# Patient Record
Sex: MALE | Race: WHITE | NOT HISPANIC OR LATINO | Employment: OTHER | URBAN - METROPOLITAN AREA
[De-identification: names, ages, dates, MRNs, and addresses within clinical notes are randomized per-mention and may not be internally consistent; named-entity substitution may affect disease eponyms.]

---

## 2017-02-08 NOTE — PATIENT DISCUSSION
Primary Open Angle Glaucoma Counseling: I have discussed no regimen of glaucoma drops at this time with the patient and have stressed the importance of compliance with follow up appts. Patient instructed to return for follow-up as scheduled.

## 2017-08-01 NOTE — PATIENT DISCUSSION
POAG, OU:  INTRAOCULAR PRESSURE IS WITHIN ACCEPTABLE LIMITS. PATIENT INSTRUCTED WILL CONTINUE TO MONITOR IOP WITHOUT DROPS AT THIS TIME. RETURN FOR FOLLOW-UP AS SCHEDULED. Atrial fibrillation with RVR Debility

## 2017-10-18 ENCOUNTER — ESTABLISHED COMPREHENSIVE EXAM (OUTPATIENT)
Dept: URBAN - METROPOLITAN AREA CLINIC 43 | Facility: CLINIC | Age: 72
End: 2017-10-18

## 2017-10-18 DIAGNOSIS — H35.371: ICD-10-CM

## 2017-10-18 DIAGNOSIS — H04.123: ICD-10-CM

## 2017-10-18 DIAGNOSIS — Z96.1: ICD-10-CM

## 2017-10-18 PROCEDURE — G8756 NO BP MEASURE DOC: HCPCS

## 2017-10-18 PROCEDURE — 92015 DETERMINE REFRACTIVE STATE: CPT

## 2017-10-18 PROCEDURE — 92014 COMPRE OPH EXAM EST PT 1/>: CPT

## 2017-10-18 PROCEDURE — 1036F TOBACCO NON-USER: CPT

## 2017-10-18 PROCEDURE — G8427 DOCREV CUR MEDS BY ELIG CLIN: HCPCS

## 2017-10-18 ASSESSMENT — TONOMETRY
OS_IOP_MMHG: 14
OD_IOP_MMHG: 13

## 2017-10-18 ASSESSMENT — VISUAL ACUITY
OD_SC: J6
OD_SC: 20/20
OS_SC: J6
OS_SC: 20/25-2

## 2017-11-21 NOTE — PATIENT DISCUSSION
No recurrence of edema in either eye. Good response to previous focal laser in both eyes. Edema has resolved.

## 2017-11-21 NOTE — PATIENT DISCUSSION
Proliferative Diabetic Retinopathy is present on examination. Mr. Eryn Hoffman retinopathy appears to be stable at this stage.

## 2017-11-21 NOTE — PATIENT DISCUSSION
Mr. Joel Ewing diabetic retinopathy appears to be stable at this stage, following extensive treatment in both eyes.  [facility] understands the need for periodic exams and I have asked him to return in 6 months. He will call to be seen sooner if he has any problems or notices any changes in his vision.

## 2018-05-22 NOTE — PATIENT DISCUSSION
POAG, OU:  INTRAOCULAR PRESSURE IS BORDERLINE. PATIENT INSTRUCTED TO START DROPS LATANOPROST QHS OU VERSUS SLT OU. PATIENT DESIRES TO START LATANOPROST QHS OU. RETURN FOR FOLLOW-UP AS SCHEDULED.

## 2018-07-09 NOTE — PATIENT DISCUSSION
POAG, OU:  INTRAOCULAR PRESSURE IS WITHIN ACCEPTABLE LIMITS. PATIENT INSTRUCTED TO CONTINUE __LATANOPROST QHS OU______ AND RETURN FOR FOLLOW-UP AS SCHEDULED.

## 2018-10-17 ENCOUNTER — ESTABLISHED COMPREHENSIVE EXAM (OUTPATIENT)
Dept: URBAN - METROPOLITAN AREA CLINIC 43 | Facility: CLINIC | Age: 73
End: 2018-10-17

## 2018-10-17 DIAGNOSIS — H04.123: ICD-10-CM

## 2018-10-17 DIAGNOSIS — Z96.1: ICD-10-CM

## 2018-10-17 DIAGNOSIS — H35.371: ICD-10-CM

## 2018-10-17 PROCEDURE — G8756 NO BP MEASURE DOC: HCPCS

## 2018-10-17 PROCEDURE — 92134 CPTRZ OPH DX IMG PST SGM RTA: CPT

## 2018-10-17 PROCEDURE — 92014 COMPRE OPH EXAM EST PT 1/>: CPT

## 2018-10-17 PROCEDURE — G8427 DOCREV CUR MEDS BY ELIG CLIN: HCPCS

## 2018-10-17 PROCEDURE — G9903 PT SCRN TBCO ID AS NON USER: HCPCS

## 2018-10-17 PROCEDURE — 1036F TOBACCO NON-USER: CPT

## 2018-10-17 ASSESSMENT — VISUAL ACUITY
OD_SC: J6
OD_SC: 20/20-1
OS_SC: J4
OS_CC: J1
OD_CC: J1
OS_SC: 20/20-2

## 2018-10-17 ASSESSMENT — TONOMETRY
OD_IOP_MMHG: 14
OS_IOP_MMHG: 13

## 2019-09-17 NOTE — PATIENT DISCUSSION
POAG, OU:  INTRAOCULAR PRESSURE IS WITHIN ACCEPTABLE LIMITS. PATIENT INSTRUCTED TO CONTINUE _LATANOPROST QHS OU______ AND RETURN FOR FOLLOW-UP AS SCHEDULED.

## 2019-10-22 ENCOUNTER — ESTABLISHED COMPREHENSIVE EXAM (OUTPATIENT)
Dept: URBAN - METROPOLITAN AREA CLINIC 43 | Facility: CLINIC | Age: 74
End: 2019-10-22

## 2019-10-22 DIAGNOSIS — Z98.890: ICD-10-CM

## 2019-10-22 DIAGNOSIS — H04.123: ICD-10-CM

## 2019-10-22 DIAGNOSIS — H35.371: ICD-10-CM

## 2019-10-22 DIAGNOSIS — H43.811: ICD-10-CM

## 2019-10-22 PROCEDURE — 92134 CPTRZ OPH DX IMG PST SGM RTA: CPT

## 2019-10-22 PROCEDURE — 92014 COMPRE OPH EXAM EST PT 1/>: CPT

## 2019-10-22 ASSESSMENT — VISUAL ACUITY
OD_SC: 20/25-1
OD_CC: J1
OD_SC: J4
OS_SC: J3
OS_CC: J1
OS_SC: 20/25

## 2019-10-22 ASSESSMENT — TONOMETRY
OS_IOP_MMHG: 14
OD_IOP_MMHG: 14

## 2020-04-29 NOTE — PATIENT DISCUSSION
POAG, OU:  INTRAOCULAR PRESSURE IS WITHIN ACCEPTABLE LIMITS. PATIENT INSTRUCTED TO CONTINUE _LATANOPROST_AND RETURN FOR FOLLOW-UP AS SCHEDULED.

## 2020-09-01 NOTE — PATIENT DISCUSSION
Mr. Martini Sickle retinal condition appears stable since I last saw him. I have asked that he return on an as needed basis.

## 2020-11-06 ENCOUNTER — ESTABLISHED COMPREHENSIVE EXAM (OUTPATIENT)
Dept: URBAN - METROPOLITAN AREA CLINIC 43 | Facility: CLINIC | Age: 75
End: 2020-11-06

## 2020-11-06 DIAGNOSIS — H35.371: ICD-10-CM

## 2020-11-06 DIAGNOSIS — H43.811: ICD-10-CM

## 2020-11-06 DIAGNOSIS — H04.123: ICD-10-CM

## 2020-11-06 PROCEDURE — 92014 COMPRE OPH EXAM EST PT 1/>: CPT

## 2020-11-06 ASSESSMENT — VISUAL ACUITY
OS_SC: 20/25-1
OS_SC: J2
OD_SC: 20/25+2
OD_SC: J3

## 2020-11-06 ASSESSMENT — TONOMETRY
OS_IOP_MMHG: 09
OD_IOP_MMHG: 10

## 2020-12-29 NOTE — PATIENT DISCUSSION
POAG, OU:  INTRAOCULAR PRESSURE IS WITHIN ACCEPTABLE LIMITS. PATIENT INSTRUCTED TO CONTINUE _LATANOPROST____ AND RETURN FOR FOLLOW-UP AS SCHEDULED.

## 2021-08-10 NOTE — PATIENT DISCUSSION
POAG, OU:  INTRAOCULAR PRESSURE IS WITHIN ACCEPTABLE LIMITS. PATIENT INSTRUCTED TO CONTINUE __LATANOPROST_ AND RETURN FOR FOLLOW-UP AS SCHEDULED.

## 2021-11-12 ENCOUNTER — ESTABLISHED COMPREHENSIVE EXAM (OUTPATIENT)
Dept: URBAN - METROPOLITAN AREA CLINIC 43 | Facility: CLINIC | Age: 76
End: 2021-11-12

## 2021-11-12 DIAGNOSIS — H35.371: ICD-10-CM

## 2021-11-12 DIAGNOSIS — Z98.890: ICD-10-CM

## 2021-11-12 DIAGNOSIS — H04.123: ICD-10-CM

## 2021-11-12 DIAGNOSIS — H43.811: ICD-10-CM

## 2021-11-12 PROCEDURE — 92014 COMPRE OPH EXAM EST PT 1/>: CPT

## 2021-11-12 PROCEDURE — 92134 CPTRZ OPH DX IMG PST SGM RTA: CPT

## 2021-11-12 ASSESSMENT — TONOMETRY
OS_IOP_MMHG: 10
OD_IOP_MMHG: 10

## 2021-11-12 ASSESSMENT — VISUAL ACUITY
OD_SC: 20/25-2
OS_SC: 20/25-2
OS_SC: J3
OU_SC: J2
OD_SC: J3
OU_SC: 20/20-2

## 2022-11-14 ENCOUNTER — COMPREHENSIVE EXAM (OUTPATIENT)
Dept: URBAN - METROPOLITAN AREA CLINIC 43 | Facility: CLINIC | Age: 77
End: 2022-11-14

## 2022-11-14 DIAGNOSIS — H43.811: ICD-10-CM

## 2022-11-14 DIAGNOSIS — H04.123: ICD-10-CM

## 2022-11-14 DIAGNOSIS — Z98.890: ICD-10-CM

## 2022-11-14 DIAGNOSIS — H35.371: ICD-10-CM

## 2022-11-14 PROCEDURE — 92134 CPTRZ OPH DX IMG PST SGM RTA: CPT

## 2022-11-14 PROCEDURE — 92014 COMPRE OPH EXAM EST PT 1/>: CPT

## 2022-11-14 ASSESSMENT — VISUAL ACUITY
OD_CC: J1
OS_SC: 20/25-3
OD_SC: J3-
OD_SC: 20/30-1
OS_CC: J1
OS_SC: J2-

## 2022-11-14 ASSESSMENT — TONOMETRY
OS_IOP_MMHG: 11
OD_IOP_MMHG: 11

## 2023-11-14 ENCOUNTER — COMPREHENSIVE EXAM (OUTPATIENT)
Dept: URBAN - METROPOLITAN AREA CLINIC 43 | Facility: CLINIC | Age: 78
End: 2023-11-14

## 2023-11-14 DIAGNOSIS — H43.811: ICD-10-CM

## 2023-11-14 DIAGNOSIS — H35.371: ICD-10-CM

## 2023-11-14 DIAGNOSIS — Z98.890: ICD-10-CM

## 2023-11-14 DIAGNOSIS — H04.123: ICD-10-CM

## 2023-11-14 PROCEDURE — 92014 COMPRE OPH EXAM EST PT 1/>: CPT

## 2023-11-14 ASSESSMENT — VISUAL ACUITY
OU_SC: 20/20-2
OU_SC: J2
OD_SC: 20/25+1
OS_SC: J3
OS_SC: 20/25-2
OD_SC: J3

## 2023-11-14 ASSESSMENT — TONOMETRY
OS_IOP_MMHG: 12
OD_IOP_MMHG: 12

## 2024-10-08 NOTE — PATIENT DISCUSSION
COUNSELING: Regarding: Wi, 77 M. had fall on 10/04/24 patient fell on face and in pain and face is swollen / pain level 4  ----- Message from Quyen VALDEZ sent at 10/8/2024  1:13 PM CDT -----  Patient Name: Abilio Padgett    Specialist or PCP Name: Dr. Allison Morrison    Symptoms: had fall on 10/04/24 patient fell on face and in pain and face is swollen / pain level 4 w    Pregnant (females aged 13-60. If Yes, how long?) : no     Call Back # :   [phone] (Mobile)        Which State are you currently located in?: [place]    Name of Clinic Site / Acct# : [facility] - [address]     Call arrived during: Work Hours

## 2024-11-19 ENCOUNTER — COMPREHENSIVE EXAM (OUTPATIENT)
Dept: URBAN - METROPOLITAN AREA CLINIC 43 | Facility: CLINIC | Age: 79
End: 2024-11-19

## 2024-11-19 DIAGNOSIS — Z98.890: ICD-10-CM

## 2024-11-19 DIAGNOSIS — H43.811: ICD-10-CM

## 2024-11-19 DIAGNOSIS — H35.371: ICD-10-CM

## 2024-11-19 DIAGNOSIS — H04.123: ICD-10-CM

## 2024-11-19 PROCEDURE — 92014 COMPRE OPH EXAM EST PT 1/>: CPT | Mod: 25

## 2024-11-19 PROCEDURE — 92134 CPTRZ OPH DX IMG PST SGM RTA: CPT
